# Patient Record
Sex: FEMALE | Race: WHITE | NOT HISPANIC OR LATINO | URBAN - METROPOLITAN AREA
[De-identification: names, ages, dates, MRNs, and addresses within clinical notes are randomized per-mention and may not be internally consistent; named-entity substitution may affect disease eponyms.]

---

## 2020-03-04 ENCOUNTER — OFFICE VISIT - HEALTHEAST (OUTPATIENT)
Dept: ADDICTION MEDICINE | Facility: CLINIC | Age: 40
End: 2020-03-04

## 2020-03-04 DIAGNOSIS — F15.20 METHAMPHETAMINE USE DISORDER, SEVERE (H): ICD-10-CM

## 2020-03-10 ENCOUNTER — COMMUNICATION - HEALTHEAST (OUTPATIENT)
Dept: ADDICTION MEDICINE | Facility: CLINIC | Age: 40
End: 2020-03-10

## 2020-05-12 ENCOUNTER — OFFICE VISIT - HEALTHEAST (OUTPATIENT)
Dept: ADDICTION MEDICINE | Facility: CLINIC | Age: 40
End: 2020-05-12

## 2020-05-12 DIAGNOSIS — F15.20 METHAMPHETAMINE USE DISORDER, SEVERE (H): ICD-10-CM

## 2020-05-15 ENCOUNTER — COMMUNICATION - HEALTHEAST (OUTPATIENT)
Dept: ADDICTION MEDICINE | Facility: CLINIC | Age: 40
End: 2020-05-15

## 2020-06-12 ENCOUNTER — OFFICE VISIT - HEALTHEAST (OUTPATIENT)
Dept: ADDICTION MEDICINE | Facility: CLINIC | Age: 40
End: 2020-06-12

## 2020-06-12 DIAGNOSIS — F15.20 METHAMPHETAMINE USE DISORDER, SEVERE (H): ICD-10-CM

## 2020-06-16 ENCOUNTER — COMMUNICATION - HEALTHEAST (OUTPATIENT)
Dept: ADDICTION MEDICINE | Facility: CLINIC | Age: 40
End: 2020-06-16

## 2021-02-16 ENCOUNTER — OFFICE VISIT - HEALTHEAST (OUTPATIENT)
Dept: ADDICTION MEDICINE | Facility: CLINIC | Age: 41
End: 2021-02-16

## 2021-02-16 DIAGNOSIS — F15.21 METHAMPHETAMINE USE DISORDER, SEVERE, IN EARLY REMISSION (H): ICD-10-CM

## 2021-02-18 ENCOUNTER — COMMUNICATION - HEALTHEAST (OUTPATIENT)
Dept: ADDICTION MEDICINE | Facility: CLINIC | Age: 41
End: 2021-02-18

## 2021-02-23 ENCOUNTER — COMMUNICATION - HEALTHEAST (OUTPATIENT)
Dept: ADDICTION MEDICINE | Facility: CLINIC | Age: 41
End: 2021-02-23

## 2021-06-01 ENCOUNTER — RECORDS - HEALTHEAST (OUTPATIENT)
Dept: ADMINISTRATIVE | Facility: CLINIC | Age: 41
End: 2021-06-01

## 2021-06-02 ENCOUNTER — RECORDS - HEALTHEAST (OUTPATIENT)
Dept: ADMINISTRATIVE | Facility: CLINIC | Age: 41
End: 2021-06-02

## 2021-06-06 NOTE — PROGRESS NOTES
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 harmful to recovery or treatment progress, or (B) Client has an actively antagonistic significant other, family, work, or living environment with immediate threat to the client's safety and well-being.      REASONS SEVERITY WAS ASSIGNED - (What support does the person have for making changes? What structure/stability does the person have in his or her daily life that willincrease the likelihood that changes can be sustained? What problems exist in the person s environment that will jeopardize getting/staying clean and sober?)     Patient is unemployed. Patient is homeless-staying where she can. Patient reports limited positive support system. Patient is not engaged in structured daily activities. Patient is on probation with [facility]. Patient has prior legals.       Client Choice/Exceptions     Would you like services specific to language, age, gender, culture, Sabianism preference, race, ethnicity, sexual orientation or disability?  no    If yes, specify:      What particular treatment choices and options would you like to have?  Residential    Do you have a preference for a particular treatment program?  No preference      .    DSM-V Criteria for Substance Abuse  Instructions  Determine whether  the client currently meets the criteria for a Substance Use Disorder using the diagnostic criteria in the DSM-V, pp. 481-585. Current means during the most recent 12 months outside a facility that controls access to substances.    Category of substance Severity ICD-10 Code/DSM V Code   []  Alcohol Use Disorder [] Mild  [] Moderate  [] Severe [] (F10.10) (305.00)  [] (F10.20) (303.90)  [] (F10.20) (303.90)   []  Cannabis Use Disorder [] Mild  [] Moderate  [] Severe [] (F12.10) (305.20)  [] (F12.20) (304.30)  [] (F12.20) (304.30)   [] Hallucinogen Use Disorder [] Mild  [] Moderate  [] Severe [] (F16.10) (305.30)  [] (F16.20) (304.50)  [] (F16.20) (304.50)   []  Inhalant Use Disorder [] Mild  [] Moderate  [] Severe [] (F18.10) (305.90)  [] (F18.20) (304.60)  [] (F18.20) (304.60)   []  Opioid Use Disorder [] Mild  [] Moderate  [] Severe [] (F11.10) (305.50)  [] (F11.20) (304.00)  [] (F11.20) (304.00)   []  Sedative, Hypnotic, or Anxiolytic Use Disorder [] Mild  [] Moderate  [] Severe [] (F13.10) (305.40)   [] (F13.20) (304.10)  [] (F13.20) (304.10)   [x]  Stimulant Related Disorders [] Mild  [] Moderate  [x] Severe [] (F15.10) (305.70) Amphetamine type substance  [] (F14.10) (305.60) Cocaine  [] (F15.10) (305.70) Other or unspecified stimulant  [] (F15.20) (304.40) Amphetamine type substance  [] (F14.20) (304.20) Cocaine  [] (F15.20) (304.40) Other or unspecified stimulant  [x] (F15.20) (304.40) Amphetamine type substance  [] (F14.20) (304.20) Cocaine  [] (F15.20) (304.40) Other or unspecified stimulant   []  Tobacco use Disorder [] Mild  [] Moderate  [] Severe [] (Z72.0) (305.1)  [] (F17.200) (305.1)  [] (F17.200) (305.1)   []  Other (or unknown) Substance Use Disorder [] Mild  [] Moderate  [] Severe [] (F19.10) (305.90)  [] (F19.20) (304.90)  [] (F19.20) (304.90)       Suggested Level of Care Necessary for Recovery  []  Inpatient  []  Extended Care []  Residential []  Outpatient  []  None            Collateral Contact  Summary   Number of contacts made:  1  Contact with referring person:  yes     If court related records were reviewed, summarize here:  [place] public record reviewed to verify criminal record.      [x]   Information from collateral contacts supported/largely agreed with information from the client and associated risk ratings.   []   Information from collateral contacts was significantly different from information from the client and lead to different risk ratings.      Summarize new information here:      Rule 25 Assessment Summary and Plan   's Recommendation    Based on the information gathered in this assessment and from collateral information, the client meets DSM-V criteria for Stimulant Use Disorder, Severe, (F15.20) (304.40) Amphetamine type substance    -Residential treatment program OR Outpatient with lodging treatment program.  -Follow recommendations of treatment program.  -Abstain from use of mood altering substances not prescribed, including alcohol.  -Consider establishing mental health care services.  -Follow recommendations of probation  -Remain law abiding.      This assessment can be updated with additional information within a 6 month period.      Collateral Contacts      Name    Veronica Stuart Relationship    [facility] Phone Number    [phone] Releases    yes       Information Provided:      03/10/2020- I've been trying to find her. She's missed her last 3 drug tests. I have sent her a notice that if she doesn't get in touch with me I am terminating her from the diversion program.  I have major concerns about her. She does not have a stable residence. I don't believe she is working.  She was doing well for a while and then she fell off the radar.     Collateral Contacts     Name    Epic Chart Review   Relationship    NA Phone Number    NA Releases    NA       Information Provided:      Epic chart reviewed.  \      A problematic pattern of alcohol/drug use leading to  clinically significant impairment or distress, as manifested by at least two of the following, occurring within a 12-month period:      Specify if: In early remission:  After full criteria for alcohol/drug use disorder were previously met, none of the criteria for alcohol/drug use disorder have been met for at least 3 months but for less than 12 months (with the exception that Criterion A4,  Craving or a strong desire or urge to use alcohol/drug  may be met).     In sustained remission:   After full criteria for alcohol use disorder were previously met, none of the criteria for alcohol/drug use disorder have been met at any time during a period of 12 months or longer (with the exception that Criterion A4,  Craving or strong desire or urge to use alcohol/drug  may be met).   Specify if:   This additional specifier is used if the individual is in an environment where access to alcohol is restricted.    Mild: Presence of 2-3 symptoms  Moderate: Presence of 4-5 symptoms  Severe: Presence of 6 or more symptoms      Staff Name and Title: RITA Funk  Date:  3/4/2020  Time:  11:03 AM

## 2021-06-08 NOTE — PROGRESS NOTES
"Individual Phone Session    Lisbeth Moore is a 39 y.o. female who is being evaluated via telephone visit.      The patient has been notified of the following:      \"We have found that certain health care needs can be provided without the need for a face to face visit.  This service lets us provide the care you need with a phone conversation. I will have full access to your [facility] medical record during this entire phone call. I will be taking notes for your medical record. Since this is like an office visit, we will bill your insurance company for this service. There are potential benefits and risks of telephone visits (e.g. limits to patient confidentiality) that differ from in-person visits.?  Confidentiality still applies for telephone services, and nobody will record the visit.  It is important to be in a quiet, private space that is free of distractions (including cell phone or other devices) during the visit.?If during the course of the call I believe a telephone visit is not appropriate, you will not be charged for this service\"    Counselor and patient spoke via phone for 25 minutes for the purpose of chemical health evaluation.     Call Notes: Counselor contacted patient for chemical health evaluation during suspension of in person services.     Provider location: Friend's home   Patient location: [facility]-Remote  Mode of Transmission: Telephone    Call Started at: 10:35 AM  Call Ended at: 10:50 AM    Patient's engagement in the telephone visit: RITA Flores  5/12/2020, 10:37 AM      "

## 2021-06-08 NOTE — PROGRESS NOTES
"Individual Phone Session    Lisbeth Moore is a 39 y.o. female who is being evaluated via telephone visit.      The patient has been notified of the following:      \"We have found that certain health care needs can be provided without the need for a face to face visit.  This service lets us provide the care you need with a phone conversation. I will have full access to your [facility] medical record during this entire phone call. I will be taking notes for your medical record. Since this is like an office visit, we will bill your insurance company for this service. There are potential benefits and risks of telephone visits (e.g. limits to patient confidentiality) that differ from in-person visits.?  Confidentiality still applies for telephone services, and nobody will record the visit.  It is important to be in a quiet, private space that is free of distractions (including cell phone or other devices) during the visit.?If during the course of the call I believe a telephone visit is not appropriate, you will not be charged for this service\"    Counselor and patient spoke via phone for 14 minutes for purpose of chemical health evaluation     Call Notes: Counselor contacted patient for chemical health evaluation during suspension of in person services.     Provider location: [facility]- Remote  Patient location: Home  Mode of Transmission: Telephone    Call Started at: 11:11 AM  Call Ended at: 11:25 AM    Patient's engagement in the telephone visit: RITA Flores  6/12/2020, 11:13 AM      "
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
## 2021-06-15 NOTE — TELEPHONE ENCOUNTER
Called Pt to discuss assessment results, including recommendation that MH services be accessed to address co-occurring disorders, with no SHERRILL treatment program recommended at this time; also, that her PO is in agreement with this recommendation, and the assessment will be faxed to her PO when completed.  Left message w/this info

## 2021-06-15 NOTE — TELEPHONE ENCOUNTER
Faxed completed Rule 25 eval to Faustina Schilling [facility] probation, [phone]. GRETTA on file.

## 2021-06-15 NOTE — PROGRESS NOTES
"Individual Phone Session    Lisbeth Moore is a 40 y.o. female who is being evaluated via telephone visit.      The patient has been notified of the following:      \"We have found that certain health care needs can be provided without the need for a face to face visit.  This service lets us provide the care you need with a phone conversation. I will have full access to your [facility] medical record during this entire phone call. I will be taking notes for your medical record. Since this is like an office visit, we will bill your insurance company for this service. There are potential benefits and risks of telephone visits (e.g. limits to patient confidentiality) that differ from in-person visits.?  Confidentiality still applies for telephone services, and nobody will record the visit.  It is important to be in a quiet, private space that is free of distractions (including cell phone or other devices) during the visit.?If during the course of the call I believe a telephone visit is not appropriate, you will not be charged for this service\"    Counselor and patient spoke via phone for 49 minutes to discuss treatment progress.     Call Notes: Counselor contacted patient for continuity of care during suspension of in person services.     Patient was actively and directly involved in the evaluation interview and treatment planning.  I have reviewed the note as documented above.  This accurately captures the substance of my conversation with the patient.    Provider location: remote   Patient location: home  Mode of Transmission: phone    Call Started at: 1:36 PM  Call Ended at: 2:25 PM    Patient's engagement in the telephone visit: Quentin Spence, [facility] [place]  2/16/2021, 2:30 PM    "